# Patient Record
Sex: MALE | Race: BLACK OR AFRICAN AMERICAN
[De-identification: names, ages, dates, MRNs, and addresses within clinical notes are randomized per-mention and may not be internally consistent; named-entity substitution may affect disease eponyms.]

---

## 2019-03-20 ENCOUNTER — HOSPITAL ENCOUNTER (EMERGENCY)
Dept: HOSPITAL 61 - ER | Age: 54
LOS: 1 days | Discharge: HOME | End: 2019-03-21
Payer: SELF-PAY

## 2019-03-20 VITALS — WEIGHT: 250 LBS | HEIGHT: 72 IN | BODY MASS INDEX: 33.86 KG/M2

## 2019-03-20 DIAGNOSIS — M62.830: ICD-10-CM

## 2019-03-20 DIAGNOSIS — M25.551: ICD-10-CM

## 2019-03-20 DIAGNOSIS — G89.29: Primary | ICD-10-CM

## 2019-03-20 PROCEDURE — 99283 EMERGENCY DEPT VISIT LOW MDM: CPT

## 2019-03-20 PROCEDURE — 96374 THER/PROPH/DIAG INJ IV PUSH: CPT

## 2019-03-20 PROCEDURE — 96375 TX/PRO/DX INJ NEW DRUG ADDON: CPT

## 2019-03-20 RX ADMIN — KETOROLAC TROMETHAMINE ONE MG: 30 INJECTION, SOLUTION INTRAMUSCULAR at 23:40

## 2019-03-20 RX ADMIN — METHYLPREDNISOLONE SODIUM SUCCINATE ONE MG: 125 INJECTION, POWDER, FOR SOLUTION INTRAMUSCULAR; INTRAVENOUS at 23:41

## 2019-03-21 VITALS — SYSTOLIC BLOOD PRESSURE: 114 MMHG | DIASTOLIC BLOOD PRESSURE: 66 MMHG

## 2019-03-21 NOTE — PHYS DOC
Past Medical History


Past Medical History:  No Pertinent History


Past Surgical History:  Other


Additional Past Surgical Histo:  bilateral ankle surgery


Alcohol Use:  Occasionally


Drug Use:  None





Adult General


Chief Complaint


Chief Complaint:  HIP PAIN





HPI


HPI





Patient is a 53  year old male who presents with 10/10 chronic right hip pain 

with back spasms that has been going on for 4 months. Patient denies any known 

injury. Denies any pain radiating to bilateral lower extremities. Denies any 

loss of bowel bladder function. He states is been worked up for this pain 

including [facility] with no acute findings. Patient denies any 

loss of bowel bladder function. He states he was at work today when his pain 

got exacerbated. He states his pain is worse on ambulation. He came via EMS 

from work. He was given fentanyl enroute to the ED.





Review of Systems


Review of Systems





Constitutional: Denies fever or chills []


: Denies dysuria or hematuria []


Musculoskeletal: Reports right hip pain with back spasms


Integument: Denies rash or skin lesions []


Neurologic: Denies headache, focal weakness or sensory changes []








All other systems were reviewed and found to be within normal limits, except as 

documented in this note.





Current Medications


Current Medications





Current Medications








 Medications


  (Trade)  Dose


 Ordered  Sig/Leah  Start Time


 Stop Time Status Last Admin


Dose Admin


 


 Diazepam


  (Valium)  5 mg  STK-MED ONCE  3/20/19 23:38


 3/20/19 23:39 DC  





 


 Ketorolac


 Tromethamine


  (Toradol 30mg


 Vial)  30 mg  STK-MED ONCE  3/20/19 23:38


 3/20/19 23:39 DC  





 


 Methylprednisolone


 Sodium Succinate


  (SOLU-Medrol


 125MG VIAL)  125 mg  STK-MED ONCE  3/20/19 23:38


 3/20/19 23:39 DC  














Allergies


Allergies





Allergies








Coded Allergies Type Severity Reaction Last Updated Verified


 


  No Known Drug Allergies    3/20/19 No











Physical Exam


Physical Exam





Constitutional: Well developed, well nourished, no acute distress, non-toxic 

appearance. []


Abdomen: Bowel sounds normal, soft, no tenderness, no masses, no pulsatile 

masses. [] 


Skin: Warm, dry, no erythema, no rash. [] 


Back: No tenderness, no CVA tenderness. [] 


Extremities: No tenderness on palpation of the hip. Full passive range of 

motion to the RLE. Negative Homans sign bilaterally. +2 right pedal pulse. Cap 

refill less than 2 seconds the right lower extremity. Sensation intact to the 

right lower extremity.


Neurologic: Alert and oriented X 3, normal motor function, normal sensory 

function, no focal deficits noted. []


Psychologic: Affect normal, judgement normal, mood normal. []





Current Patient Data


Vital Signs





 Vital Signs








  Date Time  Temp Pulse Resp B/P (MAP) Pulse Ox O2 Delivery O2 Flow Rate FiO2


 


3/20/19 23:20 98.2 87 16 132/73 (92) 98 Room Air  





 98.2       











EKG


EKG


[]





Radiology/Procedures


Radiology/Procedures


[]





Course & Med Decision Making


Course & Med Decision Making


Pertinent Labs and Imaging studies reviewed. (See chart for details)





This is a 53-year-old male patient presenting to the ED today for chronic right 

hip pain with the lumbar spasms. Patient was given fentanyl on his way to the 

ED. Given Valium Toradol and Solu-Medrol. Pain is relieved. Discharged to home 

with cyclobenzaprine, diclofenac and Medrol dosepak. [facility].





Dragon Disclaimer


Dragon Disclaimer


This electronic medical record was generated, in whole or in part, using a 

voice recognition dictation system.





Departure


Departure


Impression:  


 Primary Impression:  


 Chronic right hip pain


 Additional Impression:  


 Lumbar paraspinal muscle spasm


Disposition:  01 HOME, SELF-CARE


Condition:  STABLE


Referrals:  


NO PCP (PCP)


[facility] as [facility] as [facility] can


Patient Instructions:  Hip Pain





Additional Instructions:  


You were evaluated in the ER for right hip pain with lumbar spasms. Take the 

prescribed medications as ordered. Follow-up with your doctor in 1-2 weeks.


Scripts


Cyclobenzaprine Hcl (CYCLOBENZAPRINE HCL) 10 Mg Tablet


1 TAB PO TID, #30 TAB


   Prov: JM ODONNELL APRN         3/21/19 


Diclofenac Sodium (DICLOFENAC SODIUM) 50 Mg Tablet.dr


1 TAB PO BID, #20 TAB 0 Refills


   Prov: JM ODONNELL APRN         3/21/19 


Methylprednisolone (MEDROL) 4 Mg Tab.ds.pk


1 PKG PO UD, #1 PKG


   Prov: JM ODONNELL APRN         3/21/19





Problem Qualifiers











JM ODONNELL Mar 21, 2019 00:22